# Patient Record
Sex: MALE | Race: WHITE | NOT HISPANIC OR LATINO | Employment: FULL TIME | URBAN - METROPOLITAN AREA
[De-identification: names, ages, dates, MRNs, and addresses within clinical notes are randomized per-mention and may not be internally consistent; named-entity substitution may affect disease eponyms.]

---

## 2021-07-14 ENCOUNTER — OFFICE VISIT (OUTPATIENT)
Dept: UROLOGY | Facility: CLINIC | Age: 61
End: 2021-07-14
Payer: COMMERCIAL

## 2021-07-14 VITALS — WEIGHT: 240 LBS | BODY MASS INDEX: 31.81 KG/M2 | HEIGHT: 73 IN | RESPIRATION RATE: 18 BRPM

## 2021-07-14 DIAGNOSIS — R68.82 DECREASED LIBIDO: ICD-10-CM

## 2021-07-14 DIAGNOSIS — N40.0 BENIGN PROSTATIC HYPERPLASIA, UNSPECIFIED WHETHER LOWER URINARY TRACT SYMPTOMS PRESENT: Primary | ICD-10-CM

## 2021-07-14 LAB — TESTOST SERPL-MCNC: 411 NG/DL (ref 193–740)

## 2021-07-14 PROCEDURE — 99204 OFFICE O/P NEW MOD 45 MIN: CPT | Mod: S$GLB,,, | Performed by: NURSE PRACTITIONER

## 2021-07-14 PROCEDURE — 3008F PR BODY MASS INDEX (BMI) DOCUMENTED: ICD-10-PCS | Mod: CPTII,S$GLB,, | Performed by: NURSE PRACTITIONER

## 2021-07-14 PROCEDURE — 3008F BODY MASS INDEX DOCD: CPT | Mod: CPTII,S$GLB,, | Performed by: NURSE PRACTITIONER

## 2021-07-14 PROCEDURE — 99204 PR OFFICE/OUTPT VISIT, NEW, LEVL IV, 45-59 MIN: ICD-10-PCS | Mod: S$GLB,,, | Performed by: NURSE PRACTITIONER

## 2021-07-14 RX ORDER — PANTOPRAZOLE SODIUM 40 MG/1
40 TABLET, DELAYED RELEASE ORAL DAILY
COMMUNITY
Start: 2021-04-14

## 2021-07-14 RX ORDER — PREGABALIN 150 MG/1
150 CAPSULE ORAL 2 TIMES DAILY
COMMUNITY
Start: 2021-05-17

## 2021-07-14 RX ORDER — LOSARTAN POTASSIUM 50 MG/1
50 TABLET ORAL DAILY
COMMUNITY
Start: 2021-05-23

## 2021-07-15 ENCOUNTER — TELEPHONE (OUTPATIENT)
Dept: UROLOGY | Facility: CLINIC | Age: 61
End: 2021-07-15

## 2023-01-25 ENCOUNTER — TELEPHONE (OUTPATIENT)
Dept: GASTROENTEROLOGY | Facility: CLINIC | Age: 63
End: 2023-01-25
Payer: COMMERCIAL

## 2023-01-25 DIAGNOSIS — R04.2 HEMOPTYSIS: Primary | ICD-10-CM

## 2023-01-25 DIAGNOSIS — K21.9 GASTROESOPHAGEAL REFLUX DISEASE, UNSPECIFIED WHETHER ESOPHAGITIS PRESENT: ICD-10-CM

## 2023-01-25 DIAGNOSIS — R04.2 COUGH WITH HEMOPTYSIS: Primary | ICD-10-CM

## 2023-01-25 RX ORDER — ATORVASTATIN CALCIUM 40 MG/1
40 TABLET, FILM COATED ORAL NIGHTLY
COMMUNITY
Start: 2023-01-05

## 2023-01-25 NOTE — TELEPHONE ENCOUNTER
Patient contacted me and states on 1/1/2023, he woke up, brushed his teeth, and then coughed up some phlegm and it had some blood in it. He kept coughing up blood tinged mucus the next day so went to [facility] ER. Had chest xray at [facility] that was normal. Told it was likely a sinus infection and was given antihistamine and antibiotic. Followed up with PCP (Dr. Toi Ghotra) the next day and he ordered xray of sinuses, gave antibiotic and steroid shot. He had xray of sinuses and told normal so referred to ENT. Saw Dr. Shields, ENT, had laryngoscopy and told everything looks normal and he believes it was coming from below the neck. Since 1/1/2023, there has only been 3 days that he has not coughed up blood. Denies reflux, heartburn, or dysphagia. He usually coughs up blood when he wakes up in the morning then may happen a couple times throughout the day. He takes panto 40 daily. Denies NSAID use. I let patient know I would review with Dr. Perry on scheduling EGD for patient but that I recommend he see pulmonologist as well. He states he is being referred to Dr. Watson for pulmonology.     ENT visit note with Dr. Shields reviewed 1/18/2023: No upper airway or pharyngeal source of hemoptysis seen with laryngoscopy. Recommended GI and pulmonology eval.    Chest xray 1/3/2024: negative     Sinuses xray 1/5/2023: negative     Last EGD/Colonoscopy with Dr. Perry in 10/2021: GBx react w/chr infl w/o Hp, 6 polyps: 4 TA, repeat colon in 3y    MLC

## 2023-01-26 ENCOUNTER — OUTSIDE PLACE OF SERVICE (OUTPATIENT)
Dept: GASTROENTEROLOGY | Facility: CLINIC | Age: 63
End: 2023-01-26

## 2023-01-26 ENCOUNTER — TELEPHONE (OUTPATIENT)
Dept: GASTROENTEROLOGY | Facility: CLINIC | Age: 63
End: 2023-01-26
Payer: COMMERCIAL

## 2023-01-26 DIAGNOSIS — K21.9 GASTROESOPHAGEAL REFLUX DISEASE, UNSPECIFIED WHETHER ESOPHAGITIS PRESENT: ICD-10-CM

## 2023-01-26 DIAGNOSIS — R04.2 HEMOPTYSIS: Primary | ICD-10-CM

## 2023-01-26 DIAGNOSIS — R04.2 COUGH WITH HEMOPTYSIS: ICD-10-CM

## 2023-01-26 PROCEDURE — 43239 PR EGD, FLEX, W/BIOPSY, SGL/MULTI: ICD-10-PCS | Mod: ,,, | Performed by: INTERNAL MEDICINE

## 2023-01-26 PROCEDURE — 43239 EGD BIOPSY SINGLE/MULTIPLE: CPT | Mod: ,,, | Performed by: INTERNAL MEDICINE

## 2023-01-26 NOTE — TELEPHONE ENCOUNTER
[facility]  [address] [place] [address]  Phone: [phone]  Fax: [phone]    History & Physical         Provider: Dr. Danielle Perry    Patient Name: Ross BARROW (age):1960  62 y.o.           Gender: male   Phone: [phone]     Referring Physician: Toi Ghotra Iii     Vital Signs:   Height - 6'1  Weight - 240 lb  BMI -      Plan: EGD at [facility] [place]    Encounter Diagnoses   Name Primary?    Hemoptysis Yes    Cough with hemoptysis     Gastroesophageal reflux disease, unspecified whether esophagitis present            History:      Past Medical History:   Diagnosis Date    Dyslipidemia     GERD (gastroesophageal reflux disease)     HTN (hypertension)     Neuropathy       Past Surgical History:   Procedure Laterality Date    ARTHROSCOPY, HIP Right     CHOLECYSTECTOMY      ULNAR NERVE REPAIR      UNDESCENDED TESTICLE EXPLORATION      UVULECTOMY        Medication List with Changes/Refills   Current Medications    ATORVASTATIN (LIPITOR) 40 MG TABLET    Take 40 mg by mouth every evening.    LOSARTAN (COZAAR) 50 MG TABLET    Take 50 mg by mouth once daily.    PANTOPRAZOLE (PROTONIX) 40 MG TABLET    Take 40 mg by mouth once daily.    PREGABALIN (LYRICA) 150 MG CAPSULE    Take 150 mg by mouth 2 (two) times daily.      Review of patient's allergies indicates:  No Known Allergies   Family History   Problem Relation Age of Onset    Cancer Father     Diabetes Mother       Social History     Tobacco Use    Smoking status: Every Day   Substance Use Topics    Alcohol use: Yes        Physical Examination:     General Appearance:___________________________  HEENT:  _____________________________________  Abdomen:____________________________________  Heart:________________________________________  Lungs:_______________________________________  Extremities:___________________________________  Skin:_________________________________________  Endocrine:____________________________________  Genitourinary:_________________________________  Neurological:__________________________________      Patient has been evaluated immediately prior to sedation and is medically cleared for endoscopy with IVCS as an ASA class: ______      Physician Signature: _________________________       Date: ________  Time: ________

## 2023-01-26 NOTE — TELEPHONE ENCOUNTER
Okay to schedule EGD at [facility] [place] for 1/26/2023 per Dr. Perry. I let patient know to plan on arriving to [facility] [place] [facility] around 10 AM. He may have EGD tomorrow as long as no PA needed for procedure. Orders created and signed. Will fax to [facility] [place]. Will need to be added to Epic schedule in morning.   MLC